# Patient Record
Sex: FEMALE | URBAN - METROPOLITAN AREA
[De-identification: names, ages, dates, MRNs, and addresses within clinical notes are randomized per-mention and may not be internally consistent; named-entity substitution may affect disease eponyms.]

---

## 2023-04-10 ENCOUNTER — APPOINTMENT (OUTPATIENT)
Dept: URBAN - METROPOLITAN AREA CLINIC 275 | Age: 28
Setting detail: DERMATOLOGY
End: 2023-04-10

## 2023-04-10 DIAGNOSIS — L70.0 ACNE VULGARIS: ICD-10-CM

## 2023-04-10 PROCEDURE — OTHER ADDITIONAL NOTES: OTHER

## 2023-04-10 PROCEDURE — OTHER MEDICATION COUNSELING: OTHER

## 2023-04-10 PROCEDURE — OTHER PRESCRIPTION MEDICATION MANAGEMENT: OTHER

## 2023-04-10 PROCEDURE — OTHER MIPS QUALITY: OTHER

## 2023-04-10 PROCEDURE — 99202 OFFICE O/P NEW SF 15 MIN: CPT

## 2023-04-10 PROCEDURE — OTHER COUNSELING: OTHER

## 2023-04-10 PROCEDURE — OTHER PRESCRIPTION: OTHER

## 2023-04-10 RX ORDER — DOXYCYCLINE HYCLATE 100 MG/1
CAPSULE, GELATIN COATED ORAL
Qty: 30 | Refills: 3 | Status: ERX | COMMUNITY
Start: 2023-04-10

## 2023-04-10 RX ORDER — CLINDAMYCIN PHOSPHATE 10 MG/ML
SOLUTION TOPICAL
Qty: 60 | Refills: 4 | Status: ERX | COMMUNITY
Start: 2023-04-10

## 2023-04-10 RX ORDER — SPIRONOLACTONE 100 MG/1
TABLET, FILM COATED ORAL
Qty: 30 | Refills: 3 | Status: ERX | COMMUNITY
Start: 2023-04-10

## 2023-04-10 ASSESSMENT — LOCATION DETAILED DESCRIPTION DERM
LOCATION DETAILED: LEFT CHIN
LOCATION DETAILED: SUPERIOR MID FOREHEAD
LOCATION DETAILED: LEFT INFERIOR CENTRAL MALAR CHEEK
LOCATION DETAILED: LEFT SUPERIOR MEDIAL UPPER BACK
LOCATION DETAILED: RIGHT SUPERIOR UPPER BACK
LOCATION DETAILED: RIGHT INFERIOR CENTRAL MALAR CHEEK

## 2023-04-10 ASSESSMENT — LOCATION SIMPLE DESCRIPTION DERM
LOCATION SIMPLE: LEFT UPPER BACK
LOCATION SIMPLE: RIGHT CHEEK
LOCATION SIMPLE: CHIN
LOCATION SIMPLE: SUPERIOR FOREHEAD
LOCATION SIMPLE: LEFT CHEEK
LOCATION SIMPLE: RIGHT UPPER BACK

## 2023-04-10 ASSESSMENT — LOCATION ZONE DERM
LOCATION ZONE: FACE
LOCATION ZONE: TRUNK

## 2023-04-10 NOTE — PROCEDURE: PRESCRIPTION MEDICATION MANAGEMENT
Render In Strict Bullet Format?: No
Initiate Treatment: doxycycline hyclate 100 mg capsule \\nQuantity: 30.0 Capsule  Days Supply: 30\\nSig: Take 1 PO QD with meal\\n\\nspironolactone 100 mg tablet \\nQuantity: 30.0 Tablet  Days Supply: 30\\nSi po once daily as directed\\n\\nclindamycin phosphate 1 % topical swab \\nQuantity: 60.0 Swab  Days Supply: 30\\nSig: Apply to affected areas once or twice daily
Detail Level: Zone

## 2023-04-10 NOTE — PROCEDURE: ADDITIONAL NOTES
Additional Notes: Not previously treated with rx meds. Pt has sensitive skin. She uses cerave retinol once a week, cerave foaming cleanser and panoxyl cleanser
Render Risk Assessment In Note?: no
Detail Level: Simple

## 2023-09-01 NOTE — PROCEDURE: MEDICATION COUNSELING
9/1/2023      Dear Shanell,    There’s no question about it - preventive care can save lives. Many health problems start out silently without symptoms. Preventive care is often the only way to catch these problems in early stages, when they can be more successfully treated.     Our records show that you are due for the screening(s) listed below. If you have completed these screening(s), please call us so we can update your record.    Screening Colonoscopy   Colonoscopy: Colorectal cancer usually comes from polyps (abnormal growths) in the colon or rectum. Colonoscopy can find the polyps and remove them before they turn to cancer. To schedule your Colonoscopy or to discuss other screening options, call [phone].    Your health is important to us. Please feel free to call my office at [phone] or make an appointment to discuss the best screening options for you.     Sincerely,      Yue Howard MD   [facility] [id number] [facility] [place] [address]  Dept: [phone]  Dept Fax: [phone]     Enclosures:    Colorectal Cancer Screening  Colorectal cancer starts in cells in the colon or rectum. It's 1 of the main causes of cancer deaths in the [place] But when it's found and treated early, the chances of a full recovery are very good. It needs to be found when it's still small and hasn't spread. This cancer rarely causes symptoms in its early stages. Because of this, screening for it is important. This means looking for signs of the cancer before you have symptoms. Screening is even more important if you have risk factors for this cancer.   Risk factors for colorectal cancer  Your risk of having colorectal cancer is higher if you:   Are age 50 or older, but it can start in people younger than 50  Have a family history or personal history of colorectal cancer or polyps  Are   Are of Eastern  Congregation descent (Ashkenazi)  Have type 2 diabetes, Crohn’s  disease, or ulcerative colitis  Have an inherited genetic syndrome like Cisneros syndrome ([facility]) or familial adenomatous polyposis (FAP)  Are overweight  Are not physically active  Smoke  Drink a lot of alcohol (more than 2 drinks per day for men and 1 drink per day for women)  Eat a lot of red or processed meat  The colon and rectum  The colon and rectum are part of your digestive system. Food goes from your stomach to your small intestine. It then goes into your colon. As it travels through the colon, water is removed. The waste that is left (stool) becomes more solid. The muscles of your intestines push the stool toward the sigmoid colon. This is the last part of the colon. The stool then moves into the rectum. It's stored there until it’s ready to leave your body when you poop.   How colorectal cancer starts  Polyps are growths that can form on the inner lining of the colon and rectum. Most are benign. This means they aren’t cancer. But over time, some polyps can become cancer. These are called malignant. This happens when cells in these polyps start to grow out of control. In time, the cancer cells can spread to more of the colon and rectum. The cancer can spread to nearby organs or lymph nodes. It can spread to other parts of the body, like the liver or lungs. Finding and removing polyps early can help keep cancer from starting.   Colorectal cancer screening  Screening means looking for a health problem before you have symptoms. Screening for colorectal cancer starts with:   Your health history.Your healthcare provider will ask about your health history. They will ask you about possible cancer risk factors. Tell your healthcare provider if you have a family member who has had colorectal cancer or polyps. Tell them about any health problems you have had in the past.  Physical exam. This includes a digital rectal exam (ALINE). A ALINE might be done as part of your physical exam. To do it, your healthcare provider puts  a lubricated, gloved finger into your rectum. They check for any lumps or changes that could be cancer. This doesn't hurt and takes less than a minute. ALINE alone is not enough to screen for colorectal cancer. You'll also need 1 of the tests listed below.  Types of screening tests  The American Cancer Society and the [place] Preventive Services Task Force advise colorectal cancer screening for people at average risk starting at age 45. Talk with your healthcare provider about your risks. Ask when you should start screening tests. It's also important to check with your health insurer about your coverage.   Below are the most common types of colorectal cancer screening tests. How often you should be screened depends on your risk and the test that you and your healthcare provider choose. If you have a family history of colon cancer or are at high risk for other reasons, you may need to have screening earlier or more often.   Stool testing   Fecal occult blood test (FOBT) or fecal immunochemical test (FIT) (every 1 year)   These tests check for blood in stool that you can’t see. This is called hidden or occult blood. Hidden blood may be a sign of colon polyps or cancer. A small sample of stool is sent to a lab where it's tested for blood. Most often, you collect this sample at home using a kit your healthcare provider gives you. Make sure you know what to do and follow the instructions carefully. For example, you might need to not eat certain foods and not take some medicines before collecting stool for this test.   Stool DNA test (every 1 to 3 years)  This test looks for cells in your stool that have changed DNA in them. These DNA changes might be signs of cancer or polyps. This test also looks for hidden blood in stool. For this test, you collect an entire bowel movement. This is done using a container that's put in the toilet. The kit has instructions on how to collect, prepare, and send your stool. It goes to a lab for  testing.   Visual exams  Colonoscopy (every 10 years)  This test allows your healthcare provider to find and remove polyps in your colon or rectum. It is the only screening test that lets your healthcare provider see your entire colon and rectum. This test lets your healthcare provider remove any pieces of tissue that need to be checked for cancer. If you have an abnormal result from any other colorectal cancer screening test, you will likely need a colonoscopy.   One or 2 days before the test, you'll do a bowel prep. The bowel prep cleans out your colon. This is so the lining can be seen during the test. You'll be given instructions on how to do the prep. It will include a liquid diet. You will then use a strong laxative solution or an enema.   Just before the test, you're given medicine to make you sleepy. Then the healthcare provider gently puts a long, flexible, lighted tube (colonoscope) into your rectum. The scope is guided through your entire colon. The provider looks at images of the inside of your colon on a video screen. Any polyps seen are removed. They are sent to a lab for testing. If a polyp can’t be removed, a small piece of it is taken out for testing. If the tests show it might be cancer, the polyp might be removed later during surgery.   Flexible sigmoidoscopy (every 5 years)  This test is a lot like a colonoscopy. But it is done only on the sigmoid colon and rectum. The sigmoid colon is the last 2 feet or so that connects to your rectum. The entire colon is about 5 feet long.   One or 2 days before the test, you'll do a bowel prep. The bowel prep cleans out your colon. This is so the lining can be seen during the test. You'll be given instructions on how to do the prep. It will include a liquid diet. You will then use a strong laxative solution or an enema.   You are awake during the test. But you may be given medicine to help you relax. The healthcare provider guides a thin, flexible, lighted  tube (sigmoidoscope) into your rectum and lower colon. The images are shown on a video screen. Polyps can be removed. They are sent to a lab for testing.   Another option is flexible sigmoidoscopy every 10 years, with a FIT stool test every 1 year. Talk with your healthcare provider to learn more.   Virtual colonoscopy (every 5 years)  This test is also called a CT colonography. It uses a series of X-rays. They make a 3-D image of your colon and rectum.   One or 2 days before the test, you'll do a bowel prep. The bowel prep cleans out your colon. This is so the lining can be seen during the test. You'll be given instructions on how to do the prep. It will include a liquid diet. You will then use a strong laxative solution or an enema. The day before the test, you'll need to do a bowel prep to clean out your colon. Your healthcare provider will give you instructions on how to do this.   During the test, you'll lie on a narrow table that's part of an X-ray machine called a CT scanner. A soft, small tube will be placed into your rectum. This will fill your colon and rectum with air. The table will slide into the CT scanner. A series of X-rays will be taken. A computer will combine these to create a 3-D image. Because the test uses X-rays, it exposes you to a small amount of radiation. This test can be done without sedation. If polyps or any other changes are seen, you'll need a colonoscopy. This is done so the tissue can be removed for testing.   Talking with your healthcare provider  Talk with your healthcare provider about which screening tests might be best for you. Each test has pros and cons. But no matter which test you have, the most important thing is that you get screened. If cancer is found at an early stage during screening, it's easier to treat. And treatment is more likely to work well. Cancer can even be prevented with routine screening tests.   If you have a screening test other than a colonoscopy and  have an abnormal test result, you'll need to follow-up with colonoscopy. This would not be considered a screening colonoscopy. Your deductible and co-pay may apply. Check with your health insurer so you know what to expect.   Ask your provider about your level of risk. You may need to be screened on a different schedule if you are at higher risk of this cancer. Talk with your provider about your health history to decide on the screening plan that's best for you.   [employer] last reviewed this educational content on 8/1/2020 © 2000-2022 [employer]. All rights reserved. This information is not intended as a substitute for professional medical care. Always follow your healthcare professional's instructions.            Erythromycin Pregnancy And Lactation Text: This medication is Pregnancy Category B and is considered safe during pregnancy. It is also excreted in breast milk.

## 2024-06-26 NOTE — PROCEDURE: COUNSELING
I left a message for the patient asking for a call back to review final genetic test report.  
Minocycline Counseling: Patient advised regarding possible photosensitivity and discoloration of the teeth, skin, lips, tongue and gums.  Patient instructed to avoid sunlight, if possible.  When exposed to sunlight, patients should wear protective clothing, sunglasses, and sunscreen.  The patient was instructed to call the office immediately if the following severe adverse effects occur:  hearing changes, easy bruising/bleeding, severe headache, or vision changes.  The patient verbalized understanding of the proper use and possible adverse effects of minocycline.  All of the patient's questions and concerns were addressed.
Spironolactone Counseling: Patient advised regarding risks of diarrhea, abdominal pain, hyperkalemia, birth defects (for female patients), liver toxicity and renal toxicity. The patient may need blood work to monitor liver and kidney function and potassium levels while on therapy. The patient verbalized understanding of the proper use and possible adverse effects of spironolactone.  All of the patient's questions and concerns were addressed.
Aklief Pregnancy And Lactation Text: It is unknown if this medication is safe to use during pregnancy.  It is unknown if this medication is excreted in breast milk.  Breastfeeding women should use the topical cream on the smallest area of the skin for the shortest time needed while breastfeeding.  Do not apply to nipple and areola.
Erythromycin Counseling:  I discussed with the patient the risks of erythromycin including but not limited to GI upset, allergic reaction, drug rash, diarrhea, increase in liver enzymes, and yeast infections.
Tetracycline Counseling: Patient counseled regarding possible photosensitivity and increased risk for sunburn.  Patient instructed to avoid sunlight, if possible.  When exposed to sunlight, patients should wear protective clothing, sunglasses, and sunscreen.  The patient was instructed to call the office immediately if the following severe adverse effects occur:  hearing changes, easy bruising/bleeding, severe headache, or vision changes.  The patient verbalized understanding of the proper use and possible adverse effects of tetracycline.  All of the patient's questions and concerns were addressed. Patient understands to avoid pregnancy while on therapy due to potential birth defects.
Topical Sulfur Applications Counseling: Topical Sulfur Counseling: Patient counseled that this medication may cause skin irritation or allergic reactions.  In the event of skin irritation, the patient was advised to reduce the amount of the drug applied or use it less frequently.   The patient verbalized understanding of the proper use and possible adverse effects of topical sulfur application.  All of the patient's questions and concerns were addressed.
Minocycline Pregnancy And Lactation Text: This medication is Pregnancy Category D and not consider safe during pregnancy. It is also excreted in breast milk.
Topical Clindamycin Counseling: Patient counseled that this medication may cause skin irritation or allergic reactions.  In the event of skin irritation, the patient was advised to reduce the amount of the drug applied or use it less frequently.   The patient verbalized understanding of the proper use and possible adverse effects of clindamycin.  All of the patient's questions and concerns were addressed.
Aklief counseling:  Patient advised to apply a pea-sized amount only at bedtime and wait 30 minutes after washing their face before applying.  If too drying, patient may add a non-comedogenic moisturizer.  The most commonly reported side effects including irritation, redness, scaling, dryness, stinging, burning, itching, and increased risk of sunburn.  The patient verbalized understanding of the proper use and possible adverse effects of retinoids.  All of the patient's questions and concerns were addressed.
Include Pregnancy/Lactation Warning?: No
Tazorac Pregnancy And Lactation Text: This medication is not safe during pregnancy. It is unknown if this medication is excreted in breast milk.
Birth Control Pills Counseling: Birth Control Pill Counseling: I discussed with the patient the potential side effects of OCPs including but not limited to increased risk of stroke, heart attack, thrombophlebitis, deep venous thrombosis, hepatic adenomas, breast changes, GI upset, headaches, and depression.  The patient verbalized understanding of the proper use and possible adverse effects of OCPs. All of the patient's questions and concerns were addressed.
Topical Retinoid Pregnancy And Lactation Text: This medication is Pregnancy Category C. It is unknown if this medication is excreted in breast milk.
Azelaic Acid Pregnancy And Lactation Text: This medication is considered safe during pregnancy and breast feeding.
Birth Control Pills Pregnancy And Lactation Text: This medication should be avoided if pregnant and for the first 30 days post-partum.
Benzoyl Peroxide Pregnancy And Lactation Text: This medication is Pregnancy Category C. It is unknown if benzoyl peroxide is excreted in breast milk.
High Dose Vitamin A Counseling: Side effects reviewed, pt to contact office should one occur.
Dapsone Counseling: I discussed with the patient the risks of dapsone including but not limited to hemolytic anemia, agranulocytosis, rashes, methemoglobinemia, kidney failure, peripheral neuropathy, headaches, GI upset, and liver toxicity.  Patients who start dapsone require monitoring including baseline LFTs and weekly CBCs for the first month, then every month thereafter.  The patient verbalized understanding of the proper use and possible adverse effects of dapsone.  All of the patient's questions and concerns were addressed.
Sarecycline Counseling: Patient advised regarding possible photosensitivity and discoloration of the teeth, skin, lips, tongue and gums.  Patient instructed to avoid sunlight, if possible.  When exposed to sunlight, patients should wear protective clothing, sunglasses, and sunscreen.  The patient was instructed to call the office immediately if the following severe adverse effects occur:  hearing changes, easy bruising/bleeding, severe headache, or vision changes.  The patient verbalized understanding of the proper use and possible adverse effects of sarecycline.  All of the patient's questions and concerns were addressed.
Isotretinoin Counseling: Patient should get monthly blood tests, not donate blood, not drive at night if vision affected, not share medication, and not undergo elective surgery for 6 months after tx completed. Side effects reviewed, pt to contact office should one occur.
Doxycycline Counseling:  Patient counseled regarding possible photosensitivity and increased risk for sunburn.  Patient instructed to avoid sunlight, if possible.  When exposed to sunlight, patients should wear protective clothing, sunglasses, and sunscreen.  The patient was instructed to call the office immediately if the following severe adverse effects occur:  hearing changes, easy bruising/bleeding, severe headache, or vision changes.  The patient verbalized understanding of the proper use and possible adverse effects of doxycycline.  All of the patient's questions and concerns were addressed.
Isotretinoin Pregnancy And Lactation Text: This medication is Pregnancy Category X and is considered extremely dangerous during pregnancy. It is unknown if it is excreted in breast milk.
Azithromycin Counseling:  I discussed with the patient the risks of azithromycin including but not limited to GI upset, allergic reaction, drug rash, diarrhea, and yeast infections.
Benzoyl Peroxide Counseling: Patient counseled that medicine may cause skin irritation and bleach clothing.  In the event of skin irritation, the patient was advised to reduce the amount of the drug applied or use it less frequently.   The patient verbalized understanding of the proper use and possible adverse effects of benzoyl peroxide.  All of the patient's questions and concerns were addressed.
Topical Retinoid counseling:  Patient advised to apply a pea-sized amount only at bedtime and wait 30 minutes after washing their face before applying.  If too drying, patient may add a non-comedogenic moisturizer. The patient verbalized understanding of the proper use and possible adverse effects of retinoids.  All of the patient's questions and concerns were addressed.
Topical Clindamycin Pregnancy And Lactation Text: This medication is Pregnancy Category B and is considered safe during pregnancy. It is unknown if it is excreted in breast milk.
Erythromycin Pregnancy And Lactation Text: This medication is Pregnancy Category B and is considered safe during pregnancy. It is also excreted in breast milk.
Winlevi Counseling:  I discussed with the patient the risks of topical clascoterone including but not limited to erythema, scaling, itching, and stinging. Patient voiced their understanding.
Doxycycline Pregnancy And Lactation Text: This medication is Pregnancy Category D and not consider safe during pregnancy. It is also excreted in breast milk but is considered safe for shorter treatment courses.
Detail Level: Detailed
Winlevi Pregnancy And Lactation Text: This medication is considered safe during pregnancy and breastfeeding.
Azithromycin Pregnancy And Lactation Text: This medication is considered safe during pregnancy and is also secreted in breast milk.
Bactrim Counseling:  I discussed with the patient the risks of sulfa antibiotics including but not limited to GI upset, allergic reaction, drug rash, diarrhea, dizziness, photosensitivity, and yeast infections.  Rarely, more serious reactions can occur including but not limited to aplastic anemia, agranulocytosis, methemoglobinemia, blood dyscrasias, liver or kidney failure, lung infiltrates or desquamative/blistering drug rashes.
High Dose Vitamin A Pregnancy And Lactation Text: High dose vitamin A therapy is contraindicated during pregnancy and breast feeding.
Topical Sulfur Applications Pregnancy And Lactation Text: This medication is Pregnancy Category C and has an unknown safety profile during pregnancy. It is unknown if this topical medication is excreted in breast milk.
Dapsone Pregnancy And Lactation Text: This medication is Pregnancy Category C and is not considered safe during pregnancy or breast feeding.
Azelaic Acid Counseling: Patient counseled that medicine may cause skin irritation and to avoid applying near the eyes.  In the event of skin irritation, the patient was advised to reduce the amount of the drug applied or use it less frequently.   The patient verbalized understanding of the proper use and possible adverse effects of azelaic acid.  All of the patient's questions and concerns were addressed.
Spironolactone Pregnancy And Lactation Text: This medication can cause feminization of the male fetus and should be avoided during pregnancy. The active metabolite is also found in breast milk.
Tazorac Counseling:  Patient advised that medication is irritating and drying.  Patient may need to apply sparingly and wash off after an hour before eventually leaving it on overnight.  The patient verbalized understanding of the proper use and possible adverse effects of tazorac.  All of the patient's questions and concerns were addressed.
Bactrim Pregnancy And Lactation Text: This medication is Pregnancy Category D and is known to cause fetal risk.  It is also excreted in breast milk.